# Patient Record
Sex: MALE | Employment: FULL TIME
[De-identification: names, ages, dates, MRNs, and addresses within clinical notes are randomized per-mention and may not be internally consistent; named-entity substitution may affect disease eponyms.]

---

## 2020-02-23 ENCOUNTER — HEALTH MAINTENANCE LETTER (OUTPATIENT)
Age: 40
End: 2020-02-23

## 2020-12-12 ENCOUNTER — HEALTH MAINTENANCE LETTER (OUTPATIENT)
Age: 40
End: 2020-12-12

## 2021-04-11 ENCOUNTER — HEALTH MAINTENANCE LETTER (OUTPATIENT)
Age: 41
End: 2021-04-11

## 2021-09-26 ENCOUNTER — HEALTH MAINTENANCE LETTER (OUTPATIENT)
Age: 41
End: 2021-09-26

## 2022-05-08 ENCOUNTER — HEALTH MAINTENANCE LETTER (OUTPATIENT)
Age: 42
End: 2022-05-08

## 2023-01-08 ENCOUNTER — HEALTH MAINTENANCE LETTER (OUTPATIENT)
Age: 43
End: 2023-01-08

## 2023-06-02 ENCOUNTER — HEALTH MAINTENANCE LETTER (OUTPATIENT)
Age: 43
End: 2023-06-02

## 2023-07-24 ENCOUNTER — OFFICE VISIT (OUTPATIENT)
Dept: URGENT CARE | Facility: URGENT CARE | Age: 43
End: 2023-07-24
Payer: COMMERCIAL

## 2023-07-24 VITALS
TEMPERATURE: 97.7 F | DIASTOLIC BLOOD PRESSURE: 83 MMHG | OXYGEN SATURATION: 98 % | WEIGHT: 207.4 LBS | HEART RATE: 75 BPM | SYSTOLIC BLOOD PRESSURE: 122 MMHG
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
## 2023-07-24 PROCEDURE — 99203 OFFICE O/P NEW LOW 30 MIN: CPT | Mod: 25 | Performed by: FAMILY MEDICINE

## 2023-07-24 PROCEDURE — 90471 IMMUNIZATION ADMIN: CPT | Performed by: FAMILY MEDICINE

## 2023-07-24 PROCEDURE — 90715 TDAP VACCINE 7 YRS/> IM: CPT | Performed by: FAMILY MEDICINE

## 2023-07-25 NOTE — PROGRESS NOTES
Assessment:    ICD-10-CM    1. Dog bite, initial encounter  W54.0XXA amoxicillin-clavulanate (AUGMENTIN) 875-125 MG tablet      Will update tetanus as well.    Omar Quiñones MD ....................  7/24/2023   7:18 PM            CC: dog bite     Patient presents with:  Dog Bite: Bitten 3 days ago after breaking up a dog fight. Bruised around puncture wounds. Rt leg - one on inner thigh, outer thigh and lower leg.       HPI:  Willian Moore c/o a dog bite of his right posterior thigh after tryint to break up two fighting dogs 2 days ago.  Both dogs are known and vaccinated.  Pt had puncture wound and bruising, but would like to have it looked at.    Denies f/c.    PMHx, current meds and allergies reviewed.    Current meds:  amoxicillin-clavulanate  NO ACTIVE MEDICATIONS    O:    Vitals:    07/24/23 1803   BP: 122/83   BP Location: Right arm   Cuff Size: Adult Large   Pulse: 75   Temp: 97.7  F (36.5  C)   TempSrc: Tympanic   SpO2: 98%   Weight: 94.1 kg (207 lb 6.4 oz)     Gen:  A&O x 3, NAD  Skin:  right posterior thigh with large area of ecchymosis (7 x 10cm) with central puncture wound that is not erythematous or tender.    Lab  No results found for any visits on 07/24/23.  Diagnosis and treatment options were discussed.

## 2024-06-29 ENCOUNTER — HEALTH MAINTENANCE LETTER (OUTPATIENT)
Age: 44
End: 2024-06-29

## 2025-07-13 ENCOUNTER — HEALTH MAINTENANCE LETTER (OUTPATIENT)
Age: 45
End: 2025-07-13